# Patient Record
(demographics unavailable — no encounter records)

---

## 2025-02-28 NOTE — HISTORY OF PRESENT ILLNESS
[de-identified] : The patient is a 12-year-old male accompanied by his mother here for evaluation of his left knee.  2 days ago while he was running and training for football in the park he felt a pop in the knee.  He developed severe pain and difficulty ambulating.  He was seen and evaluated at [facility] and had x-rays in the ER and then as per the patient's mother an MRI of the left knee while in the ER and was told he has the patella fracture.  He presents today in the knee immobilizer.  He is having pain at rest.

## 2025-02-28 NOTE — DISCUSSION/SUMMARY
[de-identified] : I reviewed the x-ray findings with the patient and his mother.  He will remain in the knee immobilizer.  He can weight-bear as tolerated.  He will use ibuprofen for pain.  He will remain out of gym and sports.  He will follow-up in 10 days for further evaluation with Dr. Jim.

## 2025-02-28 NOTE — DATA REVIEWED
[Left] : left [Knee] : knee [FreeTextEntry1] : 3 views of the left knee were obtained in the office today and show: Open growth plates of the distal femur, proximal tibia and proximal fibula.  On the lateral view, there is a questionable inferior pole patella fracture.

## 2025-02-28 NOTE — IMAGING
[de-identified] : Physical exam of the left knee: No effusion, no erythema or ecchymosis.  Full extension, flexion to 120 degrees.  No tenderness to palpation over the joint lines.  He has tenderness palpation over the inferior pole of the patella.  Negative Boles's testing.  Stable to varus and valgus stress testing.  Intact to light touch, mild antalgic gait.